# Patient Record
Sex: FEMALE | Race: WHITE | NOT HISPANIC OR LATINO | URBAN - METROPOLITAN AREA
[De-identification: names, ages, dates, MRNs, and addresses within clinical notes are randomized per-mention and may not be internally consistent; named-entity substitution may affect disease eponyms.]

---

## 2022-05-12 ENCOUNTER — APPOINTMENT (RX ONLY)
Dept: URBAN - METROPOLITAN AREA CLINIC 12 | Facility: CLINIC | Age: 57
Setting detail: DERMATOLOGY
End: 2022-05-12

## 2022-05-12 DIAGNOSIS — Z41.9 ENCOUNTER FOR PROCEDURE FOR PURPOSES OTHER THAN REMEDYING HEALTH STATE, UNSPECIFIED: ICD-10-CM

## 2022-05-12 PROCEDURE — ? RESTYLANE LYFT INJECTION

## 2022-05-12 PROCEDURE — ? DYSPORT

## 2022-05-12 PROCEDURE — ? RESTYLANE DEFYNE INJECTION

## 2022-05-12 PROCEDURE — ? RESTYLANE KYSSE INJECTION

## 2022-05-12 NOTE — PROCEDURE: DYSPORT
Additional Area 1 Units: 7.5
Nasal Root Units: 5
Map Statment: See attached map for complete details
Price Per Unit In $ (Use Numbers Only, No Text Please.): 15
Additional Area 2 Units: 0
Bill Summary Price Listed Below, Or Bill Total Of Units X Price Per Unit?: Bill Summary Price Below
Forehead Units: 12.5
Use Map Statement For Sites (Optional): No
Glabellar Complex Units: 20
Additional Area 1 Location: chin
Lot #: [id number]
Detail Level: Detailed
Consent: Written consent was obtained prior to the procedure. Risks, benefits, expectations and alternatives were discussed including, but not limited to, infection, bleeding, lid/brow ptosis, bruising, swelling, diplopia, temporary effects, incomplete chemical denervation and dissatisfaction with the cosmetic outcome. No guarantee or warranty was given or implied regarding longevity of results.
Expiration Date (Month Year): [id number]
Postcare Instructions: Patient instructed to not lie down for 4 hours and limit physical activity for 24 hours. Patient instructed not to travel by airplane for 48 hours.

## 2022-05-12 NOTE — PROCEDURE: RESTYLANE DEFYNE INJECTION
Additional Area 3 Volume In Cc: 0
Consent: Written consent obtained. Risks include but not limited to bruising, beading, irregular texture, ulceration, infection, allergic reaction, scar formation, incomplete augmentation, temporary nature, procedural pain.
Anesthesia Volume In Cc: 0.5
Number Of Syringes (Required For Inventory): 1
Procedural Text: The filler was administered to the treatment areas noted above.
Use Map Statement For Sites (Optional): No
Lot #: [id number]
Map Statement: See Attach Map for Complete Details
Anesthesia Type: 1% lidocaine with epinephrine
Filler: Restylane Defyne
Post-Care Instructions: Patient instructed to apply ice to reduce swelling.
Expiration Date (Month Year): 6/30/23
Additional Anesthesia Volume In Cc: 6
Detail Level: Detailed

## 2022-05-12 NOTE — PROCEDURE: RESTYLANE KYSSE INJECTION
Filler: Restylane Kysse
Additional Area 4 Volume In Cc: 0
Procedural Text: The filler was administered to the treatment areas noted above.
Vermilion Lips Filler Volume In Cc: 0.6
Expiration Date (Month Year): 3/31/23
Map Statement: See Attach Map for Complete Details
Include Cannula Information In Note?: No
Consent: Written consent obtained. Risks include but not limited to bruising, beading, irregular texture, ulceration, infection, allergic reaction, scar formation, incomplete augmentation, temporary nature, procedural pain.
Detail Level: Detailed
Anesthesia Volume In Cc: 0.5
Post-Care Instructions: Patient instructed to apply ice to reduce swelling.
Number Of Syringes (Required For Inventory): 1
Additional Anesthesia Volume In Cc: 6
Lot #: [id number]

## 2022-05-12 NOTE — PROCEDURE: RESTYLANE LYFT INJECTION
Jawline Filler Volume In Cc: 0
Post-Care Instructions: Patient instructed to apply ice to reduce swelling.
Use Map Statement For Sites (Optional): No
Anesthesia Type: 1% lidocaine with epinephrine
Filler: Cody Grissom
Expiration Date (Month Year): 8/31/24
Additional Anesthesia Volume In Cc: 6
Additional Area 1 Location: submalar
Consent: Written consent obtained. Risks include but not limited to bruising, beading, irregular texture, ulceration, infection, allergic reaction, scar formation, incomplete augmentation, temporary nature, procedural pain.
Number Of Syringes (Required For Inventory): 1
Procedural Text: The filler was administered to the treatment areas noted above.
Anesthesia Volume In Cc: 0.5
Map Statement: See Attach Map for Complete Details
Lot #: [id number]
Cheeks Filler Volume In Cc: 2
Detail Level: Detailed

## 2022-05-24 ENCOUNTER — APPOINTMENT (RX ONLY)
Dept: URBAN - METROPOLITAN AREA CLINIC 12 | Facility: CLINIC | Age: 57
Setting detail: DERMATOLOGY
End: 2022-05-24

## 2022-05-24 DIAGNOSIS — Z42.8 ENCOUNTER FOR OTHER PLASTIC AND RECONSTRUCTIVE SURGERY FOLLOWING MEDICAL PROCEDURE OR HEALED INJURY: ICD-10-CM

## 2022-05-24 DIAGNOSIS — Z428 OTHER AFTERCARE INVOLVING THE USE OF PLASTIC SURGERY: ICD-10-CM

## 2022-05-24 PROCEDURE — ? BOTOX

## 2022-05-24 NOTE — PROCEDURE: BOTOX
Glabellar Complex Units: 0
Price Per Unit In $ (Use Numbers Only, No Text Please.): 15
Dilution (U/0.1 Cc): 5
Consent: Written consent was obtained prior to the procedure. Risks, benefits, expectations and alternatives were discussed including, but not limited to, infection, bleeding, lid/brow ptosis, bruising, swelling, diplopia, temporary effects, incomplete chemical denervation and dissatisfaction with the cosmetic outcome. No guarantee or warranty was given or implied regarding longevity of results.
Additional Area 1 Location: chin
Use Map Statement For Sites (Optional): No
Forehead Units: 2.5
Expiration Date (Month Year): 5/24
Map Statment: See attached map for complete details
Detail Level: Simple
Additional Area 2 Location: masseter
Additional Area 3 Location: gummy smile
Administered By (Optional): ARELY Mart
Lot #: [id number]
Postcare Instructions: Patient instructed to not lie down for 4 hours and limit physical activity for 24 hours. Patient instructed not to travel by airplane for 48 hours.
Show Price In Note?: yes
Bill Summary Price Listed Below, Or Bill Total Of Units X Price Per Unit?: Bill Summary Price Below

## 2022-06-07 ENCOUNTER — APPOINTMENT (RX ONLY)
Dept: URBAN - METROPOLITAN AREA CLINIC 12 | Facility: CLINIC | Age: 57
Setting detail: DERMATOLOGY
End: 2022-06-07

## 2022-06-07 DIAGNOSIS — Z428 OTHER AFTERCARE INVOLVING THE USE OF PLASTIC SURGERY: ICD-10-CM

## 2022-06-07 PROCEDURE — ? ADDITIONAL NOTES

## 2022-06-07 PROCEDURE — ? CONSULTATION - FILLERS

## 2022-06-07 NOTE — PROCEDURE: CONSULTATION - FILLERS
Procedure Quote $ (Will Render In Note. Use Numbers Only, No Text Please.): 850
Jawline Primary Filler Volume In Cc (Estimated): 0
Additional Area 5 Location: dorsal hands
Tear Trough Filler (Primary): Juvederm Volbella XC
Additional Area 4 Location: oral commissures
Send Procedure Quote As Charge: No
Tear Troughs Primary Filler Volume In Cc (Estimated): 1
Additional Area 3 Location: forehead
Detail Level: Detailed
Additional Area 2 Location: pyriformis
Additional Area 1 Location: thighs

## 2022-06-07 NOTE — PROCEDURE: ADDITIONAL NOTES
Additional Notes: Discussed CO2 for full face and neck, rather than spot treating periorbital area. Advised patient schedule a consult with Dr. Jo.
Render Risk Assessment In Note?: no

## 2022-09-23 ENCOUNTER — APPOINTMENT (RX ONLY)
Dept: URBAN - METROPOLITAN AREA CLINIC 12 | Facility: CLINIC | Age: 57
Setting detail: DERMATOLOGY
End: 2022-09-23

## 2022-09-23 DIAGNOSIS — Z41.9 ENCOUNTER FOR PROCEDURE FOR PURPOSES OTHER THAN REMEDYING HEALTH STATE, UNSPECIFIED: ICD-10-CM

## 2022-09-23 PROCEDURE — ? BOTOX

## 2022-09-23 PROCEDURE — ? CONSULTATION - FILLERS

## 2022-09-23 PROCEDURE — ? RESTYLANE KYSSE INJECTION

## 2022-09-23 PROCEDURE — ? COSMETIC CONSULTATION: INMODE MORPHEUS 8

## 2022-09-23 NOTE — PROCEDURE: BOTOX
Consent: Written consent was obtained prior to the procedure. Risks, benefits, expectations and alternatives were discussed including, but not limited to, infection, bleeding, lid/brow ptosis, bruising, swelling, diplopia, temporary effects, incomplete chemical denervation and dissatisfaction with the cosmetic outcome. No guarantee or warranty was given or implied regarding longevity of results.
Additional Area 1 Location: chin
Additional Area 2 Location: platysma
Dilution (U/0.1 Cc): 5
Expiration Date (Month Year): 4/24
Laureano Units: 0
Price Per Unit In $ (Use Numbers Only, No Text Please.): 12
Additional Area 3 Location: masseters
Forehead Units: 12.5
Glabellar Complex Units: 22.5
Periorbital Skin Units: 15
Show Price In Note?: yes
Detail Level: Simple
Postcare Instructions: Patient instructed to not lie down for 4 hours and limit physical activity for 24 hours. Patient instructed not to travel by airplane for 48 hours.
Use Map Statement For Sites (Optional): No
Lot #: [id number]
Bill Summary Price Listed Below, Or Bill Total Of Units X Price Per Unit?: Bill #Units x Price Per Unit
Administered By (Optional): ARELY Mart
Map Statment: See attached map for complete details

## 2022-09-23 NOTE — PROCEDURE: CONSULTATION - FILLERS
Additional Area 4 Secondary Filler Volume In Cc (Estimated): 0
Additional Area 1 Location: glabellar
Additional Area 5 Location: dorsal hands
Submalar Filler (Primary): Kenyon
Additional Area 4 Location: oral commissures
Send Procedure Quote As Charge: No
Additional Area 3 Location: perioral lines
Detail Level: Detailed
Submalar Primary Filler Volume In Cc (Estimated): 3
Additional Area 2 Location: glabella
Dorsal Hands Filler (Primary): Radiesse
Dorsal Hands Primary Filler Volume In Cc (Estimated): 2

## 2022-09-23 NOTE — PROCEDURE: RESTYLANE KYSSE INJECTION
Filler: Restylane Kysse
Additional Area 4 Volume In Cc: 0
Procedural Text: The filler was administered to the treatment areas noted above.
Vermilion Lips Filler Volume In Cc: 0.4
Expiration Date (Month Year): 3/31/23
Map Statement: See Attach Map for Complete Details
Include Cannula Information In Note?: No
Consent: Written consent obtained. Risks include but not limited to bruising, beading, irregular texture, ulceration, infection, allergic reaction, scar formation, incomplete augmentation, temporary nature, procedural pain.
Detail Level: Detailed
Anesthesia Volume In Cc: 0.5
Post-Care Instructions: Patient instructed to apply ice to reduce swelling.
Number Of Syringes (Required For Inventory): 1
Additional Anesthesia Volume In Cc: 6
Lot #: [id number]

## 2022-10-28 ENCOUNTER — APPOINTMENT (RX ONLY)
Dept: URBAN - METROPOLITAN AREA CLINIC 12 | Facility: CLINIC | Age: 57
Setting detail: DERMATOLOGY
End: 2022-10-28

## 2022-10-28 DIAGNOSIS — Z41.9 ENCOUNTER FOR PROCEDURE FOR PURPOSES OTHER THAN REMEDYING HEALTH STATE, UNSPECIFIED: ICD-10-CM

## 2022-10-28 DIAGNOSIS — Z42.8 ENCOUNTER FOR OTHER PLASTIC AND RECONSTRUCTIVE SURGERY FOLLOWING MEDICAL PROCEDURE OR HEALED INJURY: ICD-10-CM

## 2022-10-28 PROCEDURE — ? CONSULTATION - FILLERS

## 2022-10-28 PROCEDURE — ? ADDITIONAL NOTES

## 2022-10-28 PROCEDURE — ? BOTOX

## 2022-10-28 NOTE — PROCEDURE: CONSULTATION - FILLERS
Additional Area 1 Primary Filler Volume In Cc (Estimated): 0
Submalar Filler (Primary): Kenyon
Detail Level: Detailed
Additional Area 5 Location: upper arms
Send Procedure Quote As Charge: No
Additional Area 4 Location: neck
Additional Area 1 Location: left buttock
Additional Area 3 Location: perioral lines
Submalar Primary Filler Volume In Cc (Estimated): 3
Additional Area 2 Location: chin shadow

## 2023-01-03 NOTE — PROCEDURE: BOTOX
Additional Area 3 Units: 0
NTG paste removed prior to discharge  
Dilution (U/0.1 Cc): 5
Bill Summary Price Listed Below, Or Bill Total Of Units X Price Per Unit?: Bill #Units x Price Per Unit
Detail Level: Simple
Additional Area 3 Location: perioral
Use Map Statement For Sites (Optional): No
Expiration Date (Month Year): 7/24
Lot #: [id number]
Map Statment: See attached map for complete details
Forehead Units: 10
Postcare Instructions: Patient instructed to not lie down for 4 hours and limit physical activity for 24 hours. Patient instructed not to travel by airplane for 48 hours.
Additional Area 1 Location: chin
Administered By (Optional): Hernan Pyle PA-C
Show Price In Note?: yes
Consent: Written consent was obtained prior to the procedure. Risks, benefits, expectations and alternatives were discussed including, but not limited to, infection, bleeding, lid/brow ptosis, bruising, swelling, diplopia, temporary effects, incomplete chemical denervation and dissatisfaction with the cosmetic outcome. No guarantee or warranty was given or implied regarding longevity of results.
Additional Area 2 Location: platysma

## 2023-07-13 ENCOUNTER — WEB ENCOUNTER (OUTPATIENT)
Dept: URBAN - METROPOLITAN AREA CLINIC 92 | Facility: CLINIC | Age: 58
End: 2023-07-13

## 2023-07-13 ENCOUNTER — OFFICE VISIT (OUTPATIENT)
Dept: URBAN - METROPOLITAN AREA CLINIC 92 | Facility: CLINIC | Age: 58
End: 2023-07-13
Payer: COMMERCIAL

## 2023-07-13 VITALS
BODY MASS INDEX: 18.36 KG/M2 | TEMPERATURE: 97.8 F | WEIGHT: 117 LBS | DIASTOLIC BLOOD PRESSURE: 67 MMHG | HEIGHT: 67 IN | SYSTOLIC BLOOD PRESSURE: 110 MMHG

## 2023-07-13 DIAGNOSIS — K21.9 GERD: ICD-10-CM

## 2023-07-13 DIAGNOSIS — Z80.0 FH: COLON CANCER: ICD-10-CM

## 2023-07-13 DIAGNOSIS — K58.2 MIXED IRRITABLE BOWEL SYNDROME: ICD-10-CM

## 2023-07-13 PROCEDURE — 99204 OFFICE O/P NEW MOD 45 MIN: CPT | Performed by: INTERNAL MEDICINE

## 2023-07-13 RX ORDER — OMEPRAZOLE 40 MG/1
1 CAPSULE 30 MINUTES BEFORE MORNING MEAL CAPSULE, DELAYED RELEASE ORAL ONCE A DAY
Qty: 90 | Refills: 3 | OUTPATIENT
Start: 2023-07-13

## 2023-07-13 RX ORDER — PANTOPRAZOLE SODIUM 40 MG/1
TAKE 1 TABLET (40 MG) BY ORAL ROUTE ONCE DAILY TABLET, DELAYED RELEASE ORAL 1
Qty: 30 | Refills: 0 | Status: ACTIVE | COMMUNITY
Start: 1900-01-01

## 2023-07-13 RX ORDER — DICYCLOMINE HYDROCHLORIDE 10 MG/1
1-2 TABLETS CAPSULE ORAL
Qty: 120 | Refills: 11 | OUTPATIENT
Start: 2023-07-13 | End: 2024-07-07

## 2023-07-13 RX ORDER — CITALOPRAM 20 MG/1
TAKE 1 TABLET (20 MG) BY ORAL ROUTE ONCE DAILY TABLET, FILM COATED ORAL 1
Qty: 0 | Refills: 0 | Status: ACTIVE | COMMUNITY
Start: 1900-01-01

## 2023-07-13 NOTE — HPI-TODAY'S VISIT:
58yF with a hx of anxiety and GERD, fam hx of CRC, hx of colon polyps who p/w digestive issues.  Has been told that she has IBS and gluten sensitivity, GERD, in the past. For the past  2 months she has noted post-prandial abdominal pain, gas, urgency. Started taking Pepcid about a month ago, which has helped the pain. Has noted voice hoarseness, which is new for her. Works as a . Has a BM 1-2 times per day, ranging from runny diarrhea, occasionally hard stool. No blood in the stool. Some globus sensation. Denies melena. She is not currently taking PPI, but was on omeprazole in the past. Avoids dairy and gluten.  Grandmother with CRC, pt's last colonoscopy was in 2021, was told to repeat in 3 years.

## 2023-08-22 ENCOUNTER — APPOINTMENT (RX ONLY)
Dept: URBAN - METROPOLITAN AREA CLINIC 12 | Facility: CLINIC | Age: 58
Setting detail: DERMATOLOGY
End: 2023-08-22

## 2023-08-22 DIAGNOSIS — Z41.9 ENCOUNTER FOR PROCEDURE FOR PURPOSES OTHER THAN REMEDYING HEALTH STATE, UNSPECIFIED: ICD-10-CM

## 2023-08-22 PROCEDURE — ? RADIESSE INJECTION

## 2023-08-22 PROCEDURE — ? JUVEDERM VOLLURE XC INJECTION

## 2023-08-22 PROCEDURE — ? ADDITIONAL NOTES

## 2023-08-22 NOTE — PROCEDURE: ADDITIONAL NOTES
Render Risk Assessment In Note?: no
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

## 2023-08-22 NOTE — PROCEDURE: JUVEDERM VOLLURE XC INJECTION
Additional Area 3 Volume In Cc: 0
Number Of Syringes (Required For Inventory): 2
Additional Anesthesia Volume In Cc: 6
Use Map Statement For Sites (Optional): No
Lateral Face Filler Volume In Cc: 1
Anesthesia Volume In Cc: 0.5
Post-Care Instructions: Patient instructed to apply ice to reduce swelling.
Detail Level: Detailed
Lot #: [id number]
Consent: Written consent obtained. Risks include but not limited to bruising, beading, irregular texture, ulceration, infection, allergic reaction, scar formation, incomplete augmentation, temporary nature, procedural pain.
Procedural Text: The filler was administered to the treatment areas noted above.
Map Statment: See Attach Map for Complete Details
Anesthesia Type: 1% lidocaine with epinephrine
Filler: Juvederm Vollure XC
Expiration Date (Month Year): 6/23

## 2023-10-13 ENCOUNTER — OFFICE VISIT (OUTPATIENT)
Dept: URBAN - METROPOLITAN AREA CLINIC 92 | Facility: CLINIC | Age: 58
End: 2023-10-13

## 2023-10-13 RX ORDER — OMEPRAZOLE 40 MG/1
1 CAPSULE 30 MINUTES BEFORE MORNING MEAL CAPSULE, DELAYED RELEASE ORAL ONCE A DAY
Qty: 90 | Refills: 3 | COMMUNITY
Start: 2023-07-13

## 2023-10-13 RX ORDER — PANTOPRAZOLE SODIUM 40 MG/1
TAKE 1 TABLET (40 MG) BY ORAL ROUTE ONCE DAILY TABLET, DELAYED RELEASE ORAL 1
Qty: 30 | Refills: 0 | COMMUNITY
Start: 1900-01-01

## 2023-10-13 RX ORDER — CITALOPRAM 20 MG/1
TAKE 1 TABLET (20 MG) BY ORAL ROUTE ONCE DAILY TABLET, FILM COATED ORAL 1
Qty: 0 | Refills: 0 | COMMUNITY
Start: 1900-01-01

## 2023-10-13 RX ORDER — DICYCLOMINE HYDROCHLORIDE 10 MG/1
1-2 TABLETS CAPSULE ORAL
Qty: 120 | Refills: 11 | COMMUNITY
Start: 2023-07-13 | End: 2024-07-07

## 2023-10-25 ENCOUNTER — OFFICE VISIT (OUTPATIENT)
Dept: URBAN - METROPOLITAN AREA CLINIC 92 | Facility: CLINIC | Age: 58
End: 2023-10-25
Payer: COMMERCIAL

## 2023-10-25 ENCOUNTER — LAB OUTSIDE AN ENCOUNTER (OUTPATIENT)
Dept: URBAN - METROPOLITAN AREA CLINIC 92 | Facility: CLINIC | Age: 58
End: 2023-10-25

## 2023-10-25 ENCOUNTER — DASHBOARD ENCOUNTERS (OUTPATIENT)
Age: 58
End: 2023-10-25

## 2023-10-25 VITALS
BODY MASS INDEX: 18.36 KG/M2 | HEIGHT: 67 IN | TEMPERATURE: 96.1 F | DIASTOLIC BLOOD PRESSURE: 65 MMHG | WEIGHT: 117 LBS | HEART RATE: 59 BPM | SYSTOLIC BLOOD PRESSURE: 105 MMHG

## 2023-10-25 DIAGNOSIS — Z80.0 FH: COLON CANCER: ICD-10-CM

## 2023-10-25 DIAGNOSIS — K21.9 GERD: ICD-10-CM

## 2023-10-25 DIAGNOSIS — K58.2 MIXED IRRITABLE BOWEL SYNDROME: ICD-10-CM

## 2023-10-25 PROCEDURE — 99214 OFFICE O/P EST MOD 30 MIN: CPT | Performed by: INTERNAL MEDICINE

## 2023-10-25 RX ORDER — PANTOPRAZOLE SODIUM 40 MG/1
TAKE 1 TABLET (40 MG) BY ORAL ROUTE ONCE DAILY TABLET, DELAYED RELEASE ORAL 1
Qty: 30 | Refills: 0 | Status: ACTIVE | COMMUNITY
Start: 1900-01-01

## 2023-10-25 RX ORDER — CITALOPRAM 20 MG/1
TAKE 1 TABLET (20 MG) BY ORAL ROUTE ONCE DAILY TABLET, FILM COATED ORAL 1
Qty: 0 | Refills: 0 | Status: ACTIVE | COMMUNITY
Start: 1900-01-01

## 2023-10-25 RX ORDER — OMEPRAZOLE 40 MG/1
1 CAPSULE 30 MINUTES BEFORE MORNING MEAL CAPSULE, DELAYED RELEASE ORAL ONCE A DAY
Qty: 90 | Refills: 3 | Status: ACTIVE | COMMUNITY
Start: 2023-07-13

## 2023-10-25 RX ORDER — DICYCLOMINE HYDROCHLORIDE 10 MG/1
1-2 TABLETS CAPSULE ORAL
Qty: 120 | Refills: 11 | Status: ACTIVE | COMMUNITY
Start: 2023-07-13 | End: 2024-07-07

## 2023-10-25 NOTE — HPI-TODAY'S VISIT:
58yF with a hx of anxiety and GERD, fam hx of CRC, hx of colon polyps who p/w digestive issues.  Has been told that she has IBS and gluten sensitivity, GERD, in the past. For the past  2 months she has noted post-prandial abdominal pain, gas, urgency. Started taking Pepcid about a month ago, which has helped the pain. Has noted voice hoarseness, which is new for her. Works as a . Has a BM 1-2 times per day, ranging from runny diarrhea, occasionally hard stool. No blood in the stool. Some globus sensation. Denies melena. She is not currently taking PPI, but was on omeprazole in the past. Avoids dairy and gluten.  Grandmother with CRC, pt's last colonoscopy was in 2021, was told to repeat in 3 years.  ***10/2023: Has been on omeprazole for the past 3 months and that has been helpful. Did not start Miralax. Picked up Bentyl but has not yet required them. Had EGD at the same time as colonoscopy in 2021, and was told that she had healing ulcers.

## 2024-10-17 ENCOUNTER — ERX REFILL RESPONSE (OUTPATIENT)
Dept: URBAN - METROPOLITAN AREA CLINIC 92 | Facility: CLINIC | Age: 59
End: 2024-10-17

## 2024-10-17 RX ORDER — OMEPRAZOLE 40 MG/1
TAKE 1 CAPSULE BY MOUTH EVERY DAY 30 MINUTES BEFORE BREAKFAST CAPSULE, DELAYED RELEASE ORAL
Qty: 90 CAPSULE | Refills: 0 | OUTPATIENT

## 2024-10-17 RX ORDER — OMEPRAZOLE 40 MG/1
1 CAPSULE 30 MINUTES BEFORE MORNING MEAL CAPSULE, DELAYED RELEASE ORAL ONCE A DAY
Qty: 90 | Refills: 3 | OUTPATIENT

## 2025-01-22 ENCOUNTER — OFFICE VISIT (OUTPATIENT)
Dept: URBAN - METROPOLITAN AREA CLINIC 92 | Facility: CLINIC | Age: 60
End: 2025-01-22

## 2025-01-23 ENCOUNTER — OFFICE VISIT (OUTPATIENT)
Dept: URBAN - METROPOLITAN AREA CLINIC 92 | Facility: CLINIC | Age: 60
End: 2025-01-23

## 2025-01-23 ENCOUNTER — OFFICE VISIT (OUTPATIENT)
Dept: URBAN - METROPOLITAN AREA CLINIC 92 | Facility: CLINIC | Age: 60
End: 2025-01-23
Payer: COMMERCIAL

## 2025-01-23 VITALS
WEIGHT: 117 LBS | SYSTOLIC BLOOD PRESSURE: 101 MMHG | DIASTOLIC BLOOD PRESSURE: 63 MMHG | HEIGHT: 67 IN | TEMPERATURE: 96.9 F | HEART RATE: 56 BPM | BODY MASS INDEX: 18.36 KG/M2

## 2025-01-23 DIAGNOSIS — Z80.0 FH: COLON CANCER: ICD-10-CM

## 2025-01-23 DIAGNOSIS — R19.7 ACUTE DIARRHEA: ICD-10-CM

## 2025-01-23 DIAGNOSIS — Z86.0101 PERSONAL HISTORY OF ADENOMATOUS AND SERRATED COLON POLYPS: ICD-10-CM

## 2025-01-23 DIAGNOSIS — K58.2 MIXED IRRITABLE BOWEL SYNDROME: ICD-10-CM

## 2025-01-23 DIAGNOSIS — K21.9 GERD: ICD-10-CM

## 2025-01-23 DIAGNOSIS — R10.10 UPPER ABDOMINAL PAIN: ICD-10-CM

## 2025-01-23 PROCEDURE — 99214 OFFICE O/P EST MOD 30 MIN: CPT

## 2025-01-23 RX ORDER — CITALOPRAM 20 MG/1
TAKE 1 TABLET (20 MG) BY ORAL ROUTE ONCE DAILY TABLET, FILM COATED ORAL 1
Qty: 0 | Refills: 0 | Status: ACTIVE | COMMUNITY
Start: 1900-01-01

## 2025-01-23 RX ORDER — PANTOPRAZOLE SODIUM 40 MG/1
TAKE 1 TABLET (40 MG) BY ORAL ROUTE ONCE DAILY TABLET, DELAYED RELEASE ORAL 1
Qty: 30 | Refills: 0 | Status: ACTIVE | COMMUNITY
Start: 1900-01-01

## 2025-01-23 RX ORDER — DICYCLOMINE HYDROCHLORIDE 20 MG/1
1 TABLET TABLET ORAL
Qty: 90 TABLET | Refills: 1 | OUTPATIENT
Start: 2025-01-23 | End: 2025-03-24

## 2025-01-23 RX ORDER — OMEPRAZOLE 40 MG/1
TAKE 1 CAPSULE BY MOUTH EVERY DAY 30 MINUTES BEFORE BREAKFAST CAPSULE, DELAYED RELEASE ORAL
Qty: 90 CAPSULE | Refills: 0 | Status: ON HOLD | COMMUNITY

## 2025-01-23 NOTE — HPI-TODAY'S VISIT:
Patient is a 59 year old female with a PMH of anxiety, GERD, fam hx of CRC, hx of colon polyps who presents for diarrhea. Previously seen by Dr. Valencia in 2023,   Has been told that she has IBS and gluten sensitivity, GERD, in the past. For the past  2 months she has noted post-prandial abdominal pain, gas, urgency. Started taking Pepcid about a month ago, which has helped the pain. Has noted voice hoarseness, which is new for her. Works as a . Has a BM 1-2 times per day, ranging from runny diarrhea, occasionally hard stool. No blood in the stool. Some globus sensation. Denies melena. She is not currently taking PPI, but was on omeprazole in the past. Avoids dairy and gluten. Grandmother with CRC, pt's last colonoscopy was in 2021, was told to repeat in 3 years.  ***10/2023: Has been on omeprazole for the past 3 months and that has been helpful. Did not start Miralax. Picked up Bentyl but has not yet required them. Had EGD at the same time as colonoscopy in 2021, and was told that she had healing ulcers.  Colon 2021 normal per patient but was recommended a 3 yr follow up. Colonoscopy 2/19/16 with Dr. Lila Moffett revealed one diminutive, TA polyp in cecum. Colon 8/27/12 revealed non-thrombosed external hemorrhoids, one diminutive, HP polyp in cecum, otherwise normal. EGD 12/14/13 unremarkable, neg. for celiac and H. pylori  Today, patient notes she has two or more watery BM daily beginning three weeks ago. Denies nausea, vomiting, hematochezia, melena, or constitutional symptoms. Patient tried Imodium with benefit. Patient admits to diffuse intermittent abdominal pain graded an 8/10 which occurs after she eats. Recently traveled to [place] but her diarrhea began before. She did eat at [facility]ant before this episode where she ate brussel sproats and cooked fish. Denies recent abx use or sick contacts. Denies diet or medication changes. Denies NSAID use. Occasional alcohol intake. Denies tobacco or illicit drug use.  Patient denies any cardiac, lung, or kidney issues. No pacemaker/defibrillator, No blood thinner, No home O2. No dialysis.

## 2025-01-23 NOTE — HPI-TODAY'S VISIT:
59yF with a hx of anxiety and GERD, fam hx of CRC, hx of colon polyps who p/w digestive issues. Previously seen by Dr. Valencia.  Has been told that she has IBS and gluten sensitivity, GERD, in the past. For the past  2 months she has noted post-prandial abdominal pain, gas, urgency. Started taking Pepcid about a month ago, which has helped the pain. Has noted voice hoarseness, which is new for her. Works as a . Has a BM 1-2 times per day, ranging from runny diarrhea, occasionally hard stool. No blood in the stool. Some globus sensation. Denies melena. She is not currently taking PPI, but was on omeprazole in the past. Avoids dairy and gluten.  Grandmother with CRC, pt's last colonoscopy was in 2021, was told to repeat in 3 years.  ***10/2023: Has been on omeprazole for the past 3 months and that has been helpful. Did not start Miralax. Picked up Bentyl but has not yet required them. Had EGD at the same time as colonoscopy in 2021, and was told that she had healing ulcers.

## 2025-01-24 LAB
A/G RATIO: 1.9
ABSOLUTE BASOPHILS: 29
ABSOLUTE EOSINOPHILS: 72
ABSOLUTE LYMPHOCYTES: 1512
ABSOLUTE MONOCYTES: 418
ABSOLUTE NEUTROPHILS: 5170
ALBUMIN: 4.4
ALKALINE PHOSPHATASE: 44
ALT (SGPT): 16
AST (SGOT): 16
BASOPHILS: 0.4
BILIRUBIN, TOTAL: 0.9
BUN/CREATININE RATIO: (no result)
BUN: 21
C-REACTIVE PROTEIN, QUANT: <3
CALCIUM: 9.2
CARBON DIOXIDE, TOTAL: 28
CHLORIDE: 103
CREATININE: 1
EGFR: 66
EOSINOPHILS: 1
GLOBULIN, TOTAL: 2.3
GLUCOSE: 99
HEMATOCRIT: 39.8
HEMOGLOBIN: 13.4
LIPASE: 49
LYMPHOCYTES: 21
MCH: 32.1
MCHC: 33.7
MCV: 95.4
MONOCYTES: 5.8
MPV: 10.4
NEUTROPHILS: 71.8
PLATELET COUNT: 306
POTASSIUM: 4.8
PROTEIN, TOTAL: 6.7
RDW: 11.7
RED BLOOD CELL COUNT: 4.17
SODIUM: 140
WHITE BLOOD CELL COUNT: 7.2

## 2025-01-29 ENCOUNTER — LAB OUTSIDE AN ENCOUNTER (OUTPATIENT)
Dept: URBAN - METROPOLITAN AREA CLINIC 92 | Facility: CLINIC | Age: 60
End: 2025-01-29

## 2025-01-30 ENCOUNTER — ERX REFILL RESPONSE (OUTPATIENT)
Dept: URBAN - METROPOLITAN AREA CLINIC 92 | Facility: CLINIC | Age: 60
End: 2025-01-30

## 2025-01-30 RX ORDER — OMEPRAZOLE 40 MG/1
TAKE 1 CAPSULE BY MOUTH EVERY DAY 30 MINUTES BEFORE BREAKFAST CAPSULE, DELAYED RELEASE ORAL
Qty: 90 CAPSULE | Refills: 0 | OUTPATIENT

## 2025-01-31 ENCOUNTER — OFFICE VISIT (OUTPATIENT)
Dept: URBAN - METROPOLITAN AREA CLINIC 92 | Facility: CLINIC | Age: 60
End: 2025-01-31

## 2025-01-31 LAB
ADENOVIRUS F 40/41: NOT DETECTED
CAMPYLOBACTER: NOT DETECTED
CLOSTRIDIUM DIFFICILE: NOT DETECTED
ENTAMOEBA HISTOLYTICA: NOT DETECTED
ENTEROAGGREGATIVE E.COLI: NOT DETECTED
ENTEROTOXIGENIC E.COLI: NOT DETECTED
ESCHERICHIA COLI O157: NOT DETECTED
GIARDIA LAMBLIA: NOT DETECTED
NOROVIRUS GI/GII: NOT DETECTED
ROTAVIRUS A: NOT DETECTED
SALMONELLA SPP.: NOT DETECTED
SHIGA-LIKE TOXIN PRODUCING E.COLI: NOT DETECTED
SHIGELLA SPP. / ENTEROINVASIVE E.COLI: NOT DETECTED
VIBRIO PARAHAEMOLYTICUS: NOT DETECTED
VIBRIO SPP.: NOT DETECTED
YERSINIA ENTEROCOLITICA: NOT DETECTED

## 2025-02-07 ENCOUNTER — OFFICE VISIT (OUTPATIENT)
Dept: URBAN - METROPOLITAN AREA SURGERY CENTER 16 | Facility: SURGERY CENTER | Age: 60
End: 2025-02-07
Payer: COMMERCIAL

## 2025-02-07 ENCOUNTER — CLAIMS CREATED FROM THE CLAIM WINDOW (OUTPATIENT)
Dept: URBAN - METROPOLITAN AREA CLINIC 4 | Facility: CLINIC | Age: 60
End: 2025-02-07
Payer: COMMERCIAL

## 2025-02-07 DIAGNOSIS — K29.70 GASTRITIS, UNSPECIFIED, WITHOUT BLEEDING: ICD-10-CM

## 2025-02-07 DIAGNOSIS — D12.2 BENIGN NEOPLASM OF ASCENDING COLON: ICD-10-CM

## 2025-02-07 DIAGNOSIS — K52.832 LYMPHOCYTIC  COLITIS: ICD-10-CM

## 2025-02-07 DIAGNOSIS — K52.832 LYMPHOCYTIC COLITIS: ICD-10-CM

## 2025-02-07 DIAGNOSIS — R19.7 ACUTE DIARRHEA: ICD-10-CM

## 2025-02-07 DIAGNOSIS — Z80.0 FAMILY HISTORY OF COLON CANCER: ICD-10-CM

## 2025-02-07 DIAGNOSIS — D12.2 ADENOMA OF ASCENDING COLON: ICD-10-CM

## 2025-02-07 DIAGNOSIS — K31.89 OTHER DISEASES OF STOMACH AND DUODENUM: ICD-10-CM

## 2025-02-07 DIAGNOSIS — Z83.719 FAMILY HISTORY OF COLON POLYPS, UNSPECIFIED: ICD-10-CM

## 2025-02-07 PROCEDURE — 45385 COLONOSCOPY W/LESION REMOVAL: CPT | Performed by: INTERNAL MEDICINE

## 2025-02-07 PROCEDURE — 00813 ANES UPR LWR GI NDSC PX: CPT | Performed by: ANESTHESIOLOGY

## 2025-02-07 PROCEDURE — 88312 SPECIAL STAINS GROUP 1: CPT | Performed by: PATHOLOGY

## 2025-02-07 PROCEDURE — 45380 COLONOSCOPY AND BIOPSY: CPT | Performed by: INTERNAL MEDICINE

## 2025-02-07 PROCEDURE — 88342 IMHCHEM/IMCYTCHM 1ST ANTB: CPT | Performed by: PATHOLOGY

## 2025-02-07 PROCEDURE — 88313 SPECIAL STAINS GROUP 2: CPT | Performed by: PATHOLOGY

## 2025-02-07 PROCEDURE — 88305 TISSUE EXAM BY PATHOLOGIST: CPT | Performed by: PATHOLOGY

## 2025-02-07 PROCEDURE — 00813 ANES UPR LWR GI NDSC PX: CPT | Performed by: NURSE ANESTHETIST, CERTIFIED REGISTERED

## 2025-02-07 RX ORDER — OMEPRAZOLE 40 MG/1
TAKE 1 CAPSULE BY MOUTH EVERY DAY 30 MINUTES BEFORE BREAKFAST CAPSULE, DELAYED RELEASE ORAL
Qty: 90 CAPSULE | Refills: 0 | Status: ACTIVE | COMMUNITY

## 2025-02-07 RX ORDER — CITALOPRAM 20 MG/1
TAKE 1 TABLET (20 MG) BY ORAL ROUTE ONCE DAILY TABLET, FILM COATED ORAL 1
Qty: 0 | Refills: 0 | Status: ACTIVE | COMMUNITY
Start: 1900-01-01

## 2025-02-07 RX ORDER — DICYCLOMINE HYDROCHLORIDE 20 MG/1
1 TABLET TABLET ORAL
Qty: 90 TABLET | Refills: 1 | Status: ACTIVE | COMMUNITY
Start: 2025-01-23 | End: 2025-03-24

## 2025-02-07 RX ORDER — PANTOPRAZOLE SODIUM 40 MG/1
TAKE 1 TABLET (40 MG) BY ORAL ROUTE ONCE DAILY TABLET, DELAYED RELEASE ORAL 1
Qty: 30 | Refills: 0 | Status: ACTIVE | COMMUNITY
Start: 1900-01-01

## 2025-02-10 ENCOUNTER — TELEPHONE ENCOUNTER (OUTPATIENT)
Dept: URBAN - METROPOLITAN AREA CLINIC 92 | Facility: CLINIC | Age: 60
End: 2025-02-10

## 2025-02-17 ENCOUNTER — TELEPHONE ENCOUNTER (OUTPATIENT)
Dept: URBAN - METROPOLITAN AREA CLINIC 92 | Facility: CLINIC | Age: 60
End: 2025-02-17

## 2025-02-19 ENCOUNTER — OFFICE VISIT (OUTPATIENT)
Dept: URBAN - METROPOLITAN AREA CLINIC 124 | Facility: CLINIC | Age: 60
End: 2025-02-19
Payer: COMMERCIAL

## 2025-02-19 VITALS
HEART RATE: 61 BPM | SYSTOLIC BLOOD PRESSURE: 99 MMHG | DIASTOLIC BLOOD PRESSURE: 61 MMHG | TEMPERATURE: 97 F | BODY MASS INDEX: 18.46 KG/M2 | WEIGHT: 117.6 LBS | HEIGHT: 67 IN

## 2025-02-19 DIAGNOSIS — K21.9 GERD: ICD-10-CM

## 2025-02-19 DIAGNOSIS — Z80.0 FH: COLON CANCER: ICD-10-CM

## 2025-02-19 DIAGNOSIS — R15.2 FECAL URGENCY: ICD-10-CM

## 2025-02-19 DIAGNOSIS — K52.832 LYMPHOCYTIC COLITIS: ICD-10-CM

## 2025-02-19 DIAGNOSIS — K58.2 MIXED IRRITABLE BOWEL SYNDROME: ICD-10-CM

## 2025-02-19 PROCEDURE — 99214 OFFICE O/P EST MOD 30 MIN: CPT | Performed by: INTERNAL MEDICINE

## 2025-02-19 RX ORDER — OMEPRAZOLE 40 MG/1
TAKE 1 CAPSULE BY MOUTH EVERY DAY 30 MINUTES BEFORE BREAKFAST CAPSULE, DELAYED RELEASE ORAL
Qty: 90 CAPSULE | Refills: 0 | Status: ACTIVE | COMMUNITY

## 2025-02-19 RX ORDER — PANTOPRAZOLE SODIUM 40 MG/1
TAKE 1 TABLET (40 MG) BY ORAL ROUTE ONCE DAILY TABLET, DELAYED RELEASE ORAL 1
Qty: 30 | Refills: 0 | Status: ACTIVE | COMMUNITY
Start: 1900-01-01

## 2025-02-19 RX ORDER — DICYCLOMINE HYDROCHLORIDE 20 MG/1
1 TABLET TABLET ORAL
Qty: 90 TABLET | Refills: 1 | Status: ACTIVE | COMMUNITY
Start: 2025-01-23 | End: 2025-03-24

## 2025-02-19 RX ORDER — CITALOPRAM 20 MG/1
TAKE 1 TABLET (20 MG) BY ORAL ROUTE ONCE DAILY TABLET, FILM COATED ORAL 1
Qty: 0 | Refills: 0 | Status: ACTIVE | COMMUNITY
Start: 1900-01-01

## 2025-02-19 NOTE — HPI-TODAY'S VISIT:
59yF with a hx of anxiety and GERD, fam hx of CRC, hx of colon polyps who p/w digestive issues. Previously seen by Dr. Valencia.  Has been told that she has IBS and gluten sensitivity, GERD, in the past. For the past  2 months she has noted post-prandial abdominal pain, gas, urgency. Started taking Pepcid about a month ago, which has helped the pain. Has noted voice hoarseness, which is new for her. Works as a . Has a BM 1-2 times per day, ranging from runny diarrhea, occasionally hard stool. No blood in the stool. Some globus sensation. Denies melena. She is not currently taking PPI, but was on omeprazole in the past. Avoids dairy and gluten.  Grandmother with CRC, pt's last colonoscopy was in 2021, was told to repeat in 3 years.  ***10/2023: Has been on omeprazole for the past 3 months and that has been helpful. Did not start Miralax. Picked up Bentyl but has not yet required them. Had EGD at the same time as colonoscopy in 2021, and was told that she had healing ulcers.  ***2/2025: EGD/Colon this month - EGD neg, colon with LC and SSA 15mm, repeat 2 years. No longer having diarrhea since she took the prep. Avoids NSAIDs but does use omeprazole 40mg daily - does not have sxs if misses doses. Notes fecal and urinary urgency, hx of 4 childbirths.

## 2025-03-24 ENCOUNTER — OFFICE VISIT (OUTPATIENT)
Dept: URBAN - METROPOLITAN AREA CLINIC 92 | Facility: CLINIC | Age: 60
End: 2025-03-24

## 2025-03-24 RX ORDER — OMEPRAZOLE 20 MG/1
1 CAPSULE 1/2 TO 1 HOUR BEFORE MORNING MEAL CAPSULE, DELAYED RELEASE ORAL DAILY
Refills: 0 | Status: ACTIVE | COMMUNITY

## 2025-03-24 RX ORDER — CITALOPRAM 20 MG/1
TAKE 1 TABLET (20 MG) BY ORAL ROUTE ONCE DAILY TABLET, FILM COATED ORAL 1
Qty: 0 | Refills: 0 | Status: ACTIVE | COMMUNITY
Start: 1900-01-01

## 2025-03-24 RX ORDER — DICYCLOMINE HYDROCHLORIDE 20 MG/1
1 TABLET TABLET ORAL
Qty: 90 TABLET | Refills: 1 | Status: DISCONTINUED | COMMUNITY
Start: 2025-01-23 | End: 2025-03-24

## 2025-03-24 RX ORDER — PANTOPRAZOLE SODIUM 40 MG/1
TAKE 1 TABLET (40 MG) BY ORAL ROUTE ONCE DAILY TABLET, DELAYED RELEASE ORAL 1
Qty: 30 | Refills: 0 | Status: DISCONTINUED | COMMUNITY
Start: 1900-01-01

## 2025-03-24 NOTE — PHYSICAL EXAM GASTROINTESTINAL
soft, nontender, nondistended,  no guarding or rigidity,  no masses palpable,  normal bowel sounds,  no hepatosplenomegaly,  no rebound tenderness

## 2025-03-24 NOTE — HPI-TODAY'S VISIT:
59yF with a hx of anxiety and GERD, fam hx of CRC, hx of colon polyps who p/w digestive issues. Previously seen by Dr. Valencia.  Has been told that she has IBS and gluten sensitivity, GERD, in the past. For the past  2 months she has noted post-prandial abdominal pain, gas, urgency. Started taking Pepcid about a month ago, which has helped the pain. Has noted voice hoarseness, which is new for her. Works as a . Has a BM 1-2 times per day, ranging from runny diarrhea, occasionally hard stool. No blood in the stool. Some globus sensation. Denies melena. She is not currently taking PPI, but was on omeprazole in the past. Avoids dairy and gluten.  Grandmother with CRC, pt's last colonoscopy was in 2021, was told to repeat in 3 years.  ***10/2023: Has been on omeprazole for the past 3 months and that has been helpful. Did not start Miralax. Picked up Bentyl but has not yet required them. Had EGD at the same time as colonoscopy in 2021, and was told that she had healing ulcers.  ***2/2025: EGD/Colon this month - EGD neg, colon with LC and SSA 15mm, repeat 2 years. No longer having diarrhea since she took the prep. Avoids NSAIDs but does use omeprazole 40mg daily - does not have sxs if misses doses. Notes fecal and urinary urgency, hx of 4 childbirths.  Today, patient reports of occasional sharp or achy upper abdominal pain and diarrhea. The discomfort can increase to an 8/10. Can excaberate when she eats. She notes over the past two weeks she developed LUQ pain that radiated into her back. Denies nausea, vomiting, constitutional symptoms. Patient notes she develops diarrhea at least weekly. May be food related. Avoid dairy and gluten for the most part. When she does not have IBS she has formed stools. omeprazole 20mg QD. Normal gyn and pap smear done a month ago.

## 2025-04-28 ENCOUNTER — OFFICE VISIT (OUTPATIENT)
Dept: URBAN - METROPOLITAN AREA CLINIC 92 | Facility: CLINIC | Age: 60
End: 2025-04-28

## 2025-04-28 RX ORDER — OMEPRAZOLE 20 MG/1
1 CAPSULE 1/2 TO 1 HOUR BEFORE MORNING MEAL CAPSULE, DELAYED RELEASE ORAL DAILY
Refills: 0 | Status: ACTIVE | COMMUNITY

## 2025-04-28 RX ORDER — CITALOPRAM 20 MG/1
TAKE 1 TABLET (20 MG) BY ORAL ROUTE ONCE DAILY TABLET, FILM COATED ORAL 1
Qty: 0 | Refills: 0 | Status: ACTIVE | COMMUNITY
Start: 1900-01-01